# Patient Record
Sex: FEMALE | Race: BLACK OR AFRICAN AMERICAN | NOT HISPANIC OR LATINO | Employment: FULL TIME | URBAN - METROPOLITAN AREA
[De-identification: names, ages, dates, MRNs, and addresses within clinical notes are randomized per-mention and may not be internally consistent; named-entity substitution may affect disease eponyms.]

---

## 2018-11-09 ENCOUNTER — HOSPITAL ENCOUNTER (EMERGENCY)
Facility: HOSPITAL | Age: 43
Discharge: HOME | End: 2018-11-09
Attending: EMERGENCY MEDICINE

## 2018-11-09 VITALS
SYSTOLIC BLOOD PRESSURE: 142 MMHG | HEART RATE: 82 BPM | BODY MASS INDEX: 30.21 KG/M2 | OXYGEN SATURATION: 100 % | HEIGHT: 69 IN | RESPIRATION RATE: 18 BRPM | DIASTOLIC BLOOD PRESSURE: 78 MMHG | WEIGHT: 204 LBS | TEMPERATURE: 98 F

## 2018-11-09 DIAGNOSIS — M25.562 LEFT KNEE PAIN: Primary | ICD-10-CM

## 2018-11-09 DIAGNOSIS — M79.605 LEFT LEG PAIN: ICD-10-CM

## 2018-11-09 LAB
B-HCG UR QL: NEGATIVE
CTP QC/QA: YES

## 2018-11-09 PROCEDURE — 81025 URINE PREGNANCY TEST: CPT | Performed by: EMERGENCY MEDICINE

## 2018-11-09 PROCEDURE — 99284 EMERGENCY DEPT VISIT MOD MDM: CPT | Mod: 25

## 2018-11-09 RX ORDER — METHOCARBAMOL 750 MG/1
1500 TABLET, FILM COATED ORAL 3 TIMES DAILY
Qty: 18 TABLET | Refills: 0 | Status: SHIPPED | OUTPATIENT
Start: 2018-11-09 | End: 2018-11-12

## 2018-11-09 RX ORDER — DICLOFENAC SODIUM 10 MG/G
4 GEL TOPICAL EVERY 6 HOURS PRN
Qty: 100 G | Refills: 0 | Status: SHIPPED | OUTPATIENT
Start: 2018-11-09

## 2018-11-09 RX ORDER — IBUPROFEN 800 MG/1
800 TABLET ORAL EVERY 6 HOURS PRN
Qty: 20 TABLET | Refills: 0 | Status: SHIPPED | OUTPATIENT
Start: 2018-11-09

## 2018-11-10 NOTE — ED PROVIDER NOTES
Encounter Date: 11/9/2018    SCRIBE #1 NOTE: I, Michelle Layton, am scribing for, and in the presence of,  Dr. Interiano. I have scribed the following portions of the note - Other sections scribed: HPI, ROS, PE.       History     Chief Complaint   Patient presents with    left leg pain     pt c/o intermitt left leg pain x1 month and worsened and constant x2 days, denies trauma or injury, pt ambulates with steady gait. pain 7 of 10. denies taking medication for pain, hx of dvt     This is a 43 year old female with PMHX of DVT complaining of intermittent left leg pain for 1 month, which she reports has worsened and constant be over the past 2 days. She denies any trauma or injury to left leg, however she does work out everyday. She describes the pain as a 7 out of 10 and has taken Aleve to attempt to help with pain but it was unsuccessful. Patient has noted that pain is worse at night. She reports difficulty ambulating after she has been sitting down. Denies being on blood thinners now, but was on them for the clots in 2012. Currently on menstrual period. Denies, blood in her stool, vomiting blood, dizziness, lightedness, or numbness. She states that she is going to the doctor next week for leg pain.      The history is provided by the patient. No  was used.     Review of patient's allergies indicates:  No Known Allergies  Past Medical History:   Diagnosis Date    Deep vein thrombosis     Hypertension      Past Surgical History:   Procedure Laterality Date    ENDOMETRIAL ABLATION      TUBAL LIGATION       Family History   Problem Relation Age of Onset    Breast cancer Neg Hx     Colon cancer Neg Hx     Ovarian cancer Neg Hx      Social History     Tobacco Use    Smoking status: Never Smoker    Smokeless tobacco: Never Used   Substance Use Topics    Alcohol use: No    Drug use: No     Review of Systems   Cardiovascular: Negative for leg swelling.   Genitourinary: Negative for hematuria.    Musculoskeletal: Positive for myalgias (left leg).   Neurological: Negative for dizziness, light-headedness, numbness and headaches.   All other systems reviewed and are negative.      Physical Exam     Initial Vitals   BP Pulse Resp Temp SpO2   11/09/18 2016 11/09/18 2016 11/09/18 2016 11/09/18 2016 11/09/18 2258   (!) 154/70 80 18 98 °F (36.7 °C) 100 %      MAP       --                Physical Exam    Nursing note and vitals reviewed.  Constitutional: She appears well-developed and well-nourished. She is not diaphoretic. No distress.   HENT:   Head: Normocephalic and atraumatic.   Mouth/Throat: Oropharynx is clear and moist.   Eyes: Conjunctivae are normal.   Neck: Normal range of motion. Neck supple.   Cardiovascular: Normal rate, regular rhythm, normal heart sounds, intact distal pulses and normal pulses. Exam reveals no gallop and no friction rub.    No murmur heard.  Pulses:       Dorsalis pedis pulses are 2+ on the right side, and 2+ on the left side.   Pulmonary/Chest: Effort normal and breath sounds normal. No stridor. No respiratory distress. She has no decreased breath sounds. She has no wheezes. She has no rhonchi. She has no rales.   Musculoskeletal: Normal range of motion. She exhibits tenderness. She exhibits no edema.        Left knee: She exhibits no swelling, no deformity and no erythema. Tenderness (lateral left knee) found.   Neurological: She is alert and oriented to person, place, and time.   Skin: Skin is warm and dry.   Psychiatric: She has a normal mood and affect.         ED Course   Procedures  Labs Reviewed   POCT URINE PREGNANCY          Imaging Results          US Lower Extremity Veins Left (Final result)  Result time 11/09/18 21:53:48    Final result by Mehul Barksdale MD (11/09/18 21:53:48)                 Impression:      No evidence of deep venous thrombosis in the left lower extremity.      Electronically signed by: Mehul Barksdale MD  Date:    11/09/2018  Time:    21:53              Narrative:    EXAMINATION:  US LOWER EXTREMITY VEINS LEFT    CLINICAL HISTORY:  Pain in left leg    TECHNIQUE:  Duplex and color flow Doppler evaluation and graded compression of the left lower extremity veins was performed.    COMPARISON:  None    FINDINGS:  Left thigh veins: The common femoral, femoral, popliteal, upper greater saphenous, and deep femoral veins are patent and free of thrombus. The veins are normally compressible and have normal phasic flow and augmentation response.    Left calf veins: The visualized calf veins are patent.    Contralateral CFV: The contralateral (right) common femoral vein is patent and free of thrombus.    Miscellaneous: None                               X-Ray Knee 3 View Left (Final result)  Result time 11/09/18 21:16:23    Final result by Veto Wolf MD (11/09/18 21:16:23)                 Impression:      No acute displaced fracture-dislocation identified.      Electronically signed by: Veto Wolf MD  Date:    11/09/2018  Time:    21:16             Narrative:    EXAMINATION:  XR KNEE 3 VIEW LEFT    CLINICAL HISTORY:  Pain in left knee    TECHNIQUE:  AP, lateral, and Merchant views of the left knee were performed.    COMPARISON:  None    FINDINGS:  Bones are well mineralized. Overall alignment is within normal limits.  No displaced fracture, dislocation or destructive osseous process.  Mild degenerative change of the medial compartment..  No large suprapatellar joint effusion.  No subcutaneous emphysema or radiodense retained foreign body.                                            Scribe Attestation:   Scribe #1: I performed the above scribed service and the documentation accurately describes the services I performed. I attest to the accuracy of the note.        Labs Reviewed  Admission on 11/09/2018, Discharged on 11/09/2018   Component Date Value Ref Range Status    POC Preg Test, Ur 11/09/2018 Negative  Negative Final     Acceptable 11/09/2018  Yes   Final        Imaging Reviewed    Imaging Results          US Lower Extremity Veins Left (Final result)  Result time 11/09/18 21:53:48    Final result by Mehul Barksdale MD (11/09/18 21:53:48)                 Impression:      No evidence of deep venous thrombosis in the left lower extremity.      Electronically signed by: Mehul Barksdale MD  Date:    11/09/2018  Time:    21:53             Narrative:    EXAMINATION:  US LOWER EXTREMITY VEINS LEFT    CLINICAL HISTORY:  Pain in left leg    TECHNIQUE:  Duplex and color flow Doppler evaluation and graded compression of the left lower extremity veins was performed.    COMPARISON:  None    FINDINGS:  Left thigh veins: The common femoral, femoral, popliteal, upper greater saphenous, and deep femoral veins are patent and free of thrombus. The veins are normally compressible and have normal phasic flow and augmentation response.    Left calf veins: The visualized calf veins are patent.    Contralateral CFV: The contralateral (right) common femoral vein is patent and free of thrombus.    Miscellaneous: None                               X-Ray Knee 3 View Left (Final result)  Result time 11/09/18 21:16:23    Final result by Veto Wolf MD (11/09/18 21:16:23)                 Impression:      No acute displaced fracture-dislocation identified.      Electronically signed by: Veto Wolf MD  Date:    11/09/2018  Time:    21:16             Narrative:    EXAMINATION:  XR KNEE 3 VIEW LEFT    CLINICAL HISTORY:  Pain in left knee    TECHNIQUE:  AP, lateral, and Merchant views of the left knee were performed.    COMPARISON:  None    FINDINGS:  Bones are well mineralized. Overall alignment is within normal limits.  No displaced fracture, dislocation or destructive osseous process.  Mild degenerative change of the medial compartment..  No large suprapatellar joint effusion.  No subcutaneous emphysema or radiodense retained foreign body.                                 Medications given in ED    Medications - No data to display    This document was produced by a scribe under my direction and in my presence. I agree with the content of the note and have made any necessary edits.     Mervin Interiano MD         Note was created using voice recognition software. Note may have occasional typographical errors that may not have been identified and edited despite good moo initial review prior to signing.           Discharge Medications        Medication List      START taking these medications    diclofenac sodium 1 % Gel  Commonly known as:  VOLTAREN  Apply 4 g topically every 6 (six) hours as needed.        CHANGE how you take these medications    ibuprofen 800 MG tablet  Commonly known as:  ADVIL,MOTRIN  Take 1 tablet (800 mg total) by mouth every 6 (six) hours as needed for Pain.  What changed:    · medication strength  · how much to take        ASK your doctor about these medications    amlodipine-benazepril 10-20mg 10-20 mg per capsule  Commonly known as:  LOTREL     HYDROcodone-acetaminophen 5-325 mg per tablet  Commonly known as:  NORCO  Take 1 tablet by mouth every 6 to 8 hours as needed for Pain (As needed for severe 10 out of 10 pain).     methocarbamol 750 MG Tab  Commonly known as:  ROBAXIN  Take 2 tablets (1,500 mg total) by mouth 3 (three) times daily. for 3 days  Ask about: Should I take this medication?     naproxen 375 MG tablet  Commonly known as:  NAPROSYN  Take 1 tablet (375 mg total) by mouth 2 (two) times daily with meals.           Where to Get Your Medications      You can get these medications from any pharmacy    Bring a paper prescription for each of these medications  · diclofenac sodium 1 % Gel  · ibuprofen 800 MG tablet  · methocarbamol 750 MG Tab               Patient discharged to home in stable condition with instructions to:   1. Please take all meds as prescribed.  2. Follow-up with your primary care doctor   3. Return precautions discussed and  patient and/or family/caretaker understands to return to the emergency room for any concerns including worsening of your current symptoms, fever, chills, night sweats, worsening pain, chest pain, shortness of breath, nausea, vomiting, diarrhea, bleeding, headache, difficulty talking, visual disturbances, weakness, numbness or any other acute concerns       Clinical Impression:     1. Left knee pain    2. Left leg pain                                   Mervin Interiano MD  11/25/18 2941

## 2019-05-23 PROBLEM — Z98.51 H/O TUBAL LIGATION: Status: ACTIVE | Noted: 2019-05-23

## 2019-08-05 PROBLEM — R79.89 ELEVATED SERUM CREATININE: Status: ACTIVE | Noted: 2019-08-05

## 2021-02-23 ENCOUNTER — IMMUNIZATION (OUTPATIENT)
Dept: PRIMARY CARE CLINIC | Facility: CLINIC | Age: 46
End: 2021-02-23

## 2021-02-23 DIAGNOSIS — Z23 NEED FOR VACCINATION: Primary | ICD-10-CM

## 2021-02-23 PROCEDURE — 0001A COVID-19, MRNA, LNP-S, PF, 30 MCG/0.3 ML DOSE VACCINE: ICD-10-PCS | Mod: CV19,,, | Performed by: EMERGENCY MEDICINE

## 2021-02-23 PROCEDURE — 91300 COVID-19, MRNA, LNP-S, PF, 30 MCG/0.3 ML DOSE VACCINE: ICD-10-PCS | Mod: ,,, | Performed by: EMERGENCY MEDICINE

## 2021-02-23 PROCEDURE — 0001A COVID-19, MRNA, LNP-S, PF, 30 MCG/0.3 ML DOSE VACCINE: CPT | Mod: CV19,,, | Performed by: EMERGENCY MEDICINE

## 2021-02-23 PROCEDURE — 91300 COVID-19, MRNA, LNP-S, PF, 30 MCG/0.3 ML DOSE VACCINE: CPT | Mod: ,,, | Performed by: EMERGENCY MEDICINE

## 2021-03-16 ENCOUNTER — IMMUNIZATION (OUTPATIENT)
Dept: PRIMARY CARE CLINIC | Facility: CLINIC | Age: 46
End: 2021-03-16

## 2021-03-16 DIAGNOSIS — Z23 NEED FOR VACCINATION: Primary | ICD-10-CM

## 2021-10-27 ENCOUNTER — IMMUNIZATION (OUTPATIENT)
Dept: OBSTETRICS AND GYNECOLOGY | Facility: CLINIC | Age: 46
End: 2021-10-27

## 2021-10-27 DIAGNOSIS — Z23 NEED FOR VACCINATION: Primary | ICD-10-CM

## 2021-10-27 PROCEDURE — 91300 COVID-19, MRNA, LNP-S, PF, 30 MCG/0.3 ML DOSE VACCINE: CPT | Mod: PBBFAC

## 2021-10-27 PROCEDURE — 0003A COVID-19, MRNA, LNP-S, PF, 30 MCG/0.3 ML DOSE VACCINE: CPT | Mod: PBBFAC

## 2022-09-20 ENCOUNTER — IMMUNIZATION (OUTPATIENT)
Dept: OBSTETRICS AND GYNECOLOGY | Facility: CLINIC | Age: 47
End: 2022-09-20

## 2022-09-20 DIAGNOSIS — Z23 NEED FOR VACCINATION: Primary | ICD-10-CM

## 2022-09-20 PROCEDURE — 91312 COVID-19, MRNA, LNP-S, BIVALENT BOOSTER, PF, 30 MCG/0.3 ML DOSE: CPT | Mod: PBBFAC

## 2022-09-20 PROCEDURE — 0124A COVID-19, MRNA, LNP-S, BIVALENT BOOSTER, PF, 30 MCG/0.3 ML DOSE: CPT | Mod: PBBFAC

## 2023-09-13 ENCOUNTER — HOSPITAL ENCOUNTER (EMERGENCY)
Facility: HOSPITAL | Age: 48
Discharge: HOME | End: 2023-09-13
Attending: STUDENT IN AN ORGANIZED HEALTH CARE EDUCATION/TRAINING PROGRAM
Payer: OTHER MISCELLANEOUS

## 2023-09-13 VITALS
DIASTOLIC BLOOD PRESSURE: 74 MMHG | HEART RATE: 74 BPM | BODY MASS INDEX: 27.85 KG/M2 | HEIGHT: 69 IN | RESPIRATION RATE: 16 BRPM | SYSTOLIC BLOOD PRESSURE: 128 MMHG | OXYGEN SATURATION: 99 % | WEIGHT: 188 LBS | TEMPERATURE: 98 F

## 2023-09-13 DIAGNOSIS — M79.676 GREAT TOE PAIN: ICD-10-CM

## 2023-09-13 DIAGNOSIS — S92.919A CLOSED NONDISPLACED FRACTURE OF PHALANX OF TOE, UNSPECIFIED LATERALITY, UNSPECIFIED TOE, INITIAL ENCOUNTER: Primary | ICD-10-CM

## 2023-09-13 PROCEDURE — 99284 EMERGENCY DEPT VISIT MOD MDM: CPT

## 2023-09-13 PROCEDURE — 25000003 PHARM REV CODE 250: Performed by: STUDENT IN AN ORGANIZED HEALTH CARE EDUCATION/TRAINING PROGRAM

## 2023-09-13 RX ORDER — ONDANSETRON 4 MG/1
4 TABLET, ORALLY DISINTEGRATING ORAL
Status: COMPLETED | OUTPATIENT
Start: 2023-09-13 | End: 2023-09-13

## 2023-09-13 RX ORDER — OXYCODONE HYDROCHLORIDE 5 MG/1
5 TABLET ORAL EVERY 4 HOURS PRN
Qty: 12 TABLET | Refills: 0 | Status: SHIPPED | OUTPATIENT
Start: 2023-09-13 | End: 2023-09-16

## 2023-09-13 RX ORDER — OXYCODONE HYDROCHLORIDE 5 MG/1
5 TABLET ORAL
Status: COMPLETED | OUTPATIENT
Start: 2023-09-13 | End: 2023-09-13

## 2023-09-13 RX ORDER — ONDANSETRON 4 MG/1
8 TABLET, ORALLY DISINTEGRATING ORAL 2 TIMES DAILY
Qty: 40 TABLET | Refills: 0 | Status: SHIPPED | OUTPATIENT
Start: 2023-09-13 | End: 2023-09-23

## 2023-09-13 RX ADMIN — ONDANSETRON 4 MG: 4 TABLET, ORALLY DISINTEGRATING ORAL at 06:09

## 2023-09-13 RX ADMIN — OXYCODONE HYDROCHLORIDE 5 MG: 5 TABLET ORAL at 06:09

## 2023-09-13 NOTE — ED NOTES
Patient identifiers verified and correct for  Ms Carter   C/C: Right foot great toe paiN SEE NN  APPEARANCE: awake and alert in NAD. PAIN  10/10  SKIN: warm, dry and intact. No breakdown or bruising.  MUSCULOSKELETAL: Patient moving all extremities spontaneously, no obvious swelling or deformities noted. Ambulates independently.  RESPIRATORY: Denies shortness of breath.Respirations unlabored.   CARDIAC: Denies CP, 2+ distal pulses; no peripheral edema  ABDOMEN: S/ND/NT, Denies nausea  : voids spontaneously, denies difficulty  Neurologic: AAO x 4; follows commands equal strength in all extremities; denies numbness/tingling. Denies dizziness  Deneis new weakenss, min swelling noted.

## 2023-09-13 NOTE — ED PROVIDER NOTES
Encounter Date: 9/13/2023       History     Chief Complaint   Patient presents with    Foot Pain     R foot dropped table on foot     47 year old female who presents with right great toe pain after a table fell onto it earlier today.       Review of patient's allergies indicates:  No Known Allergies  Past Medical History:   Diagnosis Date    Deep vein thrombosis     Hypertension      Past Surgical History:   Procedure Laterality Date    ENDOMETRIAL ABLATION      HYSTERECTOMY      TUBAL LIGATION       Family History   Problem Relation Age of Onset    Breast cancer Neg Hx     Colon cancer Neg Hx     Ovarian cancer Neg Hx      Social History     Tobacco Use    Smoking status: Never    Smokeless tobacco: Never   Substance Use Topics    Alcohol use: No    Drug use: No     Review of Systems    Physical Exam     Initial Vitals [09/13/23 1708]   BP Pulse Resp Temp SpO2   (!) 149/84 82 15 98.4 °F (36.9 °C) 98 %      MAP       --         Physical Exam    Nursing note and vitals reviewed.  Constitutional: She appears well-developed and well-nourished.   HENT:   Head: Normocephalic and atraumatic.   Eyes: EOM are normal. Pupils are equal, round, and reactive to light.   Neck: Neck supple. No crepitus.   Normal range of motion.  Cardiovascular:  Normal rate, regular rhythm, normal heart sounds and intact distal pulses.     Exam reveals no S3.       No murmur heard.  Pulmonary/Chest: Breath sounds normal. No respiratory distress.   Abdominal: Abdomen is soft. Bowel sounds are normal. She exhibits no pulsatile midline mass.   Musculoskeletal:         General: Tenderness present. No edema. Normal range of motion.      Cervical back: Normal, normal range of motion and neck supple. No deformity, tenderness, bony tenderness or crepitus.      Thoracic back: Normal. No deformity, tenderness or bony tenderness.      Lumbar back: Normal. No deformity, tenderness or bony tenderness. Negative right straight leg raise test and negative left  straight leg raise test.      Comments: R ankle: normal rom, no medial or lateral tenderness. R great toe with tenderness with palpation, pain with rom. Neurovascularly intact distally.      Neurological: She is alert and oriented to person, place, and time. She has normal strength and normal reflexes. She displays normal reflexes. GCS score is 15. GCS eye subscore is 4. GCS verbal subscore is 5. GCS motor subscore is 6.   Skin: Skin is warm and dry. Capillary refill takes less than 2 seconds.   Psychiatric: She has a normal mood and affect. Thought content normal.         ED Course   Procedures  Labs Reviewed - No data to display         Imaging Results              X-Ray Foot Complete Right (Final result)  Result time 09/13/23 20:09:59      Final result by Jaiden Cherry MD (09/13/23 20:09:59)                   Impression:      Questionable nondisplaced fracture involving the 1st distal phalangeal tuft.  Suggest correlation with point tenderness and mechanism of injury.      Electronically signed by: Jaiden Cherry MD  Date:    09/13/2023  Time:    20:09               Narrative:    EXAMINATION:  XR FOOT COMPLETE 3 VIEW RIGHT    CLINICAL HISTORY:  Pain in unspecified toe(s).  Great toe pain.    TECHNIQUE:  AP, lateral, and oblique views of the right foot were performed.    COMPARISON:  None.    FINDINGS:  Nondisplaced obliquely oriented lucency involving the 1st distal phalangeal tuft, potentially related to nondisplaced fracture or overlapping artifact as this finding is not well delineated on the oblique or lateral views.  No intra-articular involvement identified on the frontal view.  No additional acute displaced fracture.  No dislocation.  No unexpected radiopaque foreign body.                                       Medications   oxyCODONE immediate release tablet 5 mg (5 mg Oral Given 9/13/23 1832)   ondansetron disintegrating tablet 4 mg (4 mg Oral Given 9/13/23 1831)     Medical Decision  Making  Hemodynamically stable. Afebrile. Phonating and protecting the airway spontaneously. No clinical evidence for cardiovascular instability or impending airway compromise. Examination as above. Additional historians include  at bedside. Prior medical records reviewed.     Plan:  Xray, analgesics.       Amount and/or Complexity of Data Reviewed  Radiology: ordered. Decision-making details documented in ED Course.    Risk  Prescription drug management.               ED Course as of 09/13/23 2042   Wed Sep 13, 2023   2025 X-Ray Foot Complete Right [BG]   2025 Xray reviewed. Will place in walking boot and give orthopedic followup.  [BG]      ED Course User Index  [BG] Karthik Mansfield MD                    Clinical Impression:   Final diagnoses:  [M79.676] Great toe pain  [S92.919A] Closed nondisplaced fracture of phalanx of toe, unspecified laterality, unspecified toe, initial encounter (Primary)        ED Disposition Condition    Discharge Stable          ED Prescriptions       Medication Sig Dispense Start Date End Date Auth. Provider    oxyCODONE (ROXICODONE) 5 MG immediate release tablet Take 1 tablet (5 mg total) by mouth every 4 (four) hours as needed for Pain. 12 tablet 9/13/2023 9/16/2023 Karthik Mansfield MD    ondansetron (ZOFRAN-ODT) 4 MG TbDL Take 2 tablets (8 mg total) by mouth 2 (two) times daily. for 10 days 40 tablet 9/13/2023 9/23/2023 Karthik Mansfield MD          Follow-up Information       Follow up With Specialties Details Why Contact Info    [facility] Emergency Medicine Go to  As needed [id number]  [facility] [id number] [phone]             Karthik Mansfield MD  09/13/23 2043

## 2023-09-14 NOTE — DISCHARGE INSTRUCTIONS
